# Patient Record
Sex: FEMALE
[De-identification: names, ages, dates, MRNs, and addresses within clinical notes are randomized per-mention and may not be internally consistent; named-entity substitution may affect disease eponyms.]

---

## 2017-06-27 ENCOUNTER — HOSPITAL ENCOUNTER (OUTPATIENT)
Dept: HOSPITAL 31 - C.ER | Age: 57
Setting detail: OBSERVATION
Discharge: HOME | End: 2017-06-27
Attending: EMERGENCY MEDICINE | Admitting: EMERGENCY MEDICINE
Payer: MEDICAID

## 2017-06-27 VITALS — TEMPERATURE: 98.3 F | HEART RATE: 92 BPM | SYSTOLIC BLOOD PRESSURE: 125 MMHG | DIASTOLIC BLOOD PRESSURE: 60 MMHG

## 2017-06-27 VITALS — BODY MASS INDEX: 32.5 KG/M2

## 2017-06-27 VITALS — RESPIRATION RATE: 16 BRPM

## 2017-06-27 VITALS — OXYGEN SATURATION: 95 %

## 2017-06-27 DIAGNOSIS — Z87.891: ICD-10-CM

## 2017-06-27 DIAGNOSIS — K59.00: ICD-10-CM

## 2017-06-27 DIAGNOSIS — K57.92: Primary | ICD-10-CM

## 2017-06-27 LAB
ALBUMIN/GLOB SERPL: 0.7 {RATIO} (ref 1–2.1)
ALP SERPL-CCNC: 93 U/L (ref 38–126)
ALT SERPL-CCNC: 37 U/L (ref 9–52)
AST SERPL-CCNC: 55 U/L (ref 14–36)
BACTERIA #/AREA URNS HPF: (no result) /[HPF]
BASOPHILS # BLD AUTO: 0.1 K/UL (ref 0–0.2)
BASOPHILS NFR BLD: 0.8 % (ref 0–2)
BILIRUB SERPL-MCNC: 0.8 MG/DL (ref 0.2–1.3)
BILIRUB UR-MCNC: NEGATIVE MG/DL
BUN SERPL-MCNC: 10 MG/DL (ref 7–17)
CALCIUM SERPL-MCNC: 9.3 MG/DL (ref 8.6–10.4)
CHLORIDE SERPL-SCNC: 102 MMOL/L (ref 98–107)
CO2 SERPL-SCNC: 24 MMOL/L (ref 22–30)
EOSINOPHIL # BLD AUTO: 0.1 K/UL (ref 0–0.7)
EOSINOPHIL NFR BLD: 2.2 % (ref 0–4)
ERYTHROCYTE [DISTWIDTH] IN BLOOD BY AUTOMATED COUNT: 13.3 % (ref 11.5–14.5)
GLOBULIN SER-MCNC: 5.5 GM/DL (ref 2.2–3.9)
GLUCOSE SERPL-MCNC: 94 MG/DL (ref 65–105)
GLUCOSE UR STRIP-MCNC: NORMAL MG/DL
HCT VFR BLD CALC: 44.2 % (ref 34–47)
KETONES UR STRIP-MCNC: NEGATIVE MG/DL
LEUKOCYTE ESTERASE UR-ACNC: (no result) LEU/UL
LYMPHOCYTES # BLD AUTO: 1.2 K/UL (ref 1–4.3)
LYMPHOCYTES NFR BLD AUTO: 19.5 % (ref 20–40)
MCH RBC QN AUTO: 30.3 PG (ref 27–31)
MCHC RBC AUTO-ENTMCNC: 34.5 G/DL (ref 33–37)
MCV RBC AUTO: 87.9 FL (ref 81–99)
MONOCYTES # BLD: 0.6 K/UL (ref 0–0.8)
MONOCYTES NFR BLD: 9.3 % (ref 0–10)
NRBC BLD AUTO-RTO: 0.1 % (ref 0–2)
PH UR STRIP: 5 [PH] (ref 5–8)
PLATELET # BLD: 242 K/UL (ref 130–400)
PMV BLD AUTO: 7.9 FL (ref 7.2–11.7)
POTASSIUM SERPL-SCNC: 4.4 MMOL/L (ref 3.6–5.2)
PROT SERPL-MCNC: 9.2 G/DL (ref 6.3–8.3)
PROT UR STRIP-MCNC: NEGATIVE MG/DL
RBC # UR STRIP: NEGATIVE /UL
RBC #/AREA URNS HPF: 1 /HPF (ref 0–3)
SODIUM SERPL-SCNC: 137 MMOL/L (ref 132–148)
SP GR UR STRIP: 1.01 (ref 1–1.03)
UROBILINOGEN UR-MCNC: NORMAL MG/DL (ref 0.2–1)
WBC # BLD AUTO: 6.1 K/UL (ref 4.8–10.8)
WBC #/AREA URNS HPF: 11 /HPF (ref 0–5)

## 2017-06-27 PROCEDURE — 74177 CT ABD & PELVIS W/CONTRAST: CPT

## 2017-06-27 PROCEDURE — 86900 BLOOD TYPING SEROLOGIC ABO: CPT

## 2017-06-27 PROCEDURE — 81001 URINALYSIS AUTO W/SCOPE: CPT

## 2017-06-27 PROCEDURE — 86850 RBC ANTIBODY SCREEN: CPT

## 2017-06-27 PROCEDURE — 96367 TX/PROPH/DG ADDL SEQ IV INF: CPT

## 2017-06-27 PROCEDURE — 96375 TX/PRO/DX INJ NEW DRUG ADDON: CPT

## 2017-06-27 PROCEDURE — 96365 THER/PROPH/DIAG IV INF INIT: CPT

## 2017-06-27 PROCEDURE — 80053 COMPREHEN METABOLIC PANEL: CPT

## 2017-06-27 PROCEDURE — 87086 URINE CULTURE/COLONY COUNT: CPT

## 2017-06-27 PROCEDURE — 36415 COLL VENOUS BLD VENIPUNCTURE: CPT

## 2017-06-27 PROCEDURE — 93005 ELECTROCARDIOGRAM TRACING: CPT

## 2017-06-27 PROCEDURE — 71010: CPT

## 2017-06-27 PROCEDURE — 85025 COMPLETE CBC W/AUTO DIFF WBC: CPT

## 2017-06-27 PROCEDURE — 99285 EMERGENCY DEPT VISIT HI MDM: CPT

## 2017-06-27 PROCEDURE — 83690 ASSAY OF LIPASE: CPT

## 2017-06-27 NOTE — RAD
PROCEDURE:  CHEST RADIOGRAPH, 1 VIEW



HISTORY:

Abdominal pain 



COMPARISON:

06/01/2015



FINDINGS:



LUNGS:

Biapical pleural thickening. Moderate venous congestion.  Diffuse 

increased interstitial lung markings. Right hilar prominence. 



PLEURA:

No pneumothorax or pleural fluid seen.



CARDIOVASCULAR:

Normal.



OSSEOUS STRUCTURES:

No significant abnormalities.



VISUALIZED UPPER ABDOMEN:

Normal.



OTHER FINDINGS:

None. 



IMPRESSION:

Biapical pleural thickening. Moderate venous congestion.  Diffuse 

increased interstitial lung markings. Right hilar prominence.

## 2017-06-27 NOTE — CT
PROCEDURE:  CT Abdomen and Pelvis with contrast



HISTORY:

abd pain



COMPARISON:

12/29/2015



TECHNIQUE:

Contrast dose: 100 mL Visipaque 320



Radiation dose:



Total exam DLP = 1203.84 mGy-cm.



This CT exam was performed using one or more of the following dose 

reduction techniques: Automated exposure control, adjustment of the 

mA and/or kV according to patient size, and/or use of iterative 

reconstruction technique.



FINDINGS:



LOWER THORAX:

Multifocal confluent ground-glass opacity in both lower lobes, 

nonspecific.  Possibly infectious versus inflammatory. Small areas of 

natalya consolidation in both lower lobes. 



Small hiatal hernia noted. 



LIVER:

Unremarkable. No gross lesion or ductal dilatation. 



GALLBLADDER AND BILE DUCTS:

Status post cholecystectomy.  Mild dilatation of common bile duct up 

to 9 mm.  Consistent with prior cholecystectomy. Small amount of air 

seen adjacent to distal common bile duct, possibly due to duodenal 

diverticulum. This finding is unchanged compared to prior examination 



PANCREAS:

Unremarkable. No gross lesion or ductal dilatation.



SPLEEN:

Normal size. 1.5 cm ovoid low-attenuation lesion.  In retrospect, on 

noncontrast CT, there was 12 mm low-attenuation lesion. Comparison is 

made between contrast enhanced and noncontrast study of, of limited 

significance for determination of interval change.  Likely hemangioma 

or lymphangioma.  No other mass. 



ADRENALS:

Unremarkable. No mass. 



KIDNEYS AND URETERS:

Unremarkable. No hydronephrosis. No solid mass. Homogeneous 

parenchymal enhancement.  No evidence of pyelonephritis. 



VASCULATURE:

Unremarkable. No aortic aneurysm. 



BOWEL:

Acute diverticulitis at the junction of the descending and sigmoid 

colon.  No evidence of abscess. No free intraperitoneal air.  There 

is extensive diverticulosis of the descending and sigmoid colon.  

Scattered colonic diverticulae elsewhere.  There are no other 

abnormal bowel loops identified.  There is no evidence of bowel 

obstruction. 



APPENDIX:

Normal appendix. 



PERITONEUM:

There is a supraumbilical ventral hernia containing a loop of 

nonobstructed transverse colon. There is no inguinal hernia. 



LYMPH NODES:

Unremarkable. No enlarged lymph nodes. 



BLADDER:

Unremarkable. 



REPRODUCTIVE:

Normal uterus 



BONES:

Mild thoracolumbar levoscoliosis. 



OTHER FINDINGS:

None.



IMPRESSION:

Multifocal ground-glass opacity with small areas of confluent opacity 

in both lower lobes and in the right middle lobe.  Concerning for an 

infectious process. Acute diverticulitis at the junction of the 

descending and sigmoid colon without evidence of abscess or free air. 

Incidental findings as above.

## 2017-06-27 NOTE — C.PDOC
History Of Present Illness


57 yr old female presents to the ER with complaints of worsening abdominal pain 

for the past 1 week. Patient states she saw her PMD for same complaint and 

states she is s/p outpatient abdominal ultrasound but is pending follow up 

results. Patient states the pain is localized in lower abdominal and has had 

similar pain daily for the past 1 year but is now constant and intense. Patient 

reports new onset of intermittent diarrhea/constipation for the past 2 weeks 

and new onset of black bowel movement for the past 3 weeks. States he has been 

using peptobismol but black bowel movement began before pepto use. Patient is s/

p colonoscopy and ENDO, states had a polyp removal only. Patient denies fever, 

dysuria, incontinence, hematuira, back pain, weakness or numbness. 








WORSENING ABD PAIN X 1 WEEK. SAW PMD FOR SAME S/P OUTPT ABD US BUT WAS PENDING 

FU FOR RESULTS. PAIN LOWER ABD LOCALIZED. HAS HAD SIM PAIN DAILY X 1 YEAR BUT 

NOW MORE CONSTANT AND INTENSE. NEW ONSET INTERMIT DIARRHEA/CONSTIPATION X 2 

WEEKS. NEW ONSET BLACK BM X 3 WEEKS. USING PEPTOBISMOL BUT BLACK BM ONSET BEGAN 

BEFORE PEPTO USE. S/P COLONSCOPY AND ENDO, PS POLYP REMOVAL ONLY. PSH CATHY. 

DENIES UTI SX. 





HO MID ABD HERNIA FOR 6 MONTHS. DENIES PAIN IN AREA, LAST BM 2 DAYS AGO





EXAM


MILD DIST NONTOXIC


LUNGS CTA B/L NO W/R/R


ABD +REDUCIBLE VENTRAL HERNIA NONTEND. +B/L LQ TEND MILD SOFT NO R/G. OBESE


REMAINDER NEG


Time Seen by Provider: 17 08:25


Chief Complaint (Nursing): Abdominal Pain


History Per: Patient


History/Exam Limitations: no limitations


Onset/Duration Of Symptoms: Worse Since (1 week )


Current Symptoms Are (Timing): Still Present


Location Of Pain/Discomfort: Diffuse





Past Medical History


Reviewed: Historical Data, Nursing Documentation, Vital Signs


Vital Signs: 


 Last Vital Signs











Temp  98.4 F   17 07:46


 


Pulse  84   17 09:30


 


Resp  16   17 09:30


 


BP  116/67   17 09:30


 


Pulse Ox  95   17 13:17














- Medical History


PMH: Arthritis, Diverticulitis (diverticulosis)


Surgical History: Cholecystectomy, Endoscopy





- [facility] Procedures








CHOLECYSTECTOMY (13)


CLOSED ENDOSCOPIC BIOPSY OF LARGE INTESTINE (02/05/15)


ENDO RECTUM POLYPECTOMY (02/05/15)


ENDOSC POLYPECTOMY OF LG INTEST (02/05/15)


ESOPHAGOGASTRODUODENOSCOPY [EGD] W/CLOSED BIOPSY (13)








Family History: States: No Known Family Hx





- Social History


Hx Tobacco Use: Yes


Hx Alcohol Use: Yes


Hx Substance Use: No





- Immunization History


Hx Tetanus Toxoid Vaccination: No


Hx Influenza Vaccination: No


Hx Pneumococcal Vaccination: No





Review Of Systems


Except As Marked, All Systems Reviewed And Found Negative.


Constitutional: Negative for: Fever


Gastrointestinal: Positive for: Abdominal Pain, Diarrhea, Constipation


Neurological: Negative for: Weakness, Numbness





Physical Exam





- Physical Exam


Appears: Non-toxic, In Acute Distress (Mild )


Skin: Warm, Dry


Head: Atraumatic, Normacephalic


Chest: Symmetrical


Cardiovascular: Rhythm Regular, No Murmur


Respiratory: Normal Breath Sounds, No Rales, No Rhonchi, No Wheezing


Gastrointestinal/Abdominal: Soft, Tenderness (Bilateral lower quadrent 

tenderness, mild. ), No Guarding, No Rebound, Hernia (reducible ventral hernia, 

non tender. ), Other ((+) Obese )


Extremity: Normal ROM, No Swelling


Neurological/Psych: Oriented x3, Normal Speech, Normal Motor





ED Course And Treatment





- Laboratory Results


Result Diagrams: 


 17 09:26





 17 09:26


ECG: Interpreted By Me, Viewed By Me


ECG Rhythm: Sinus Rhythm


ECG Interpretation: No Acute Changes


Rate From EC (BPM )


O2 Sat by Pulse Oximetry: 95 (RA )


Pulse Ox Interpretation: Normal





- Other Rad


  ** CXR


X-Ray: Viewed By Me, Read By Radiologist


Interpretation: PROCEDURE:  CHEST RADIOGRAPH, 1 VIEW.  HISTORY:  Abdominal 

pain.  COMPARISON:  2015.  FINDINGS:  LUNGS:  Biapical pleural 

thickening. Moderate venous congestion.  Diffuse increased interstitial lung 

markings. Right hilar prominence.  PLEURA:  No pneumothorax or pleural fluid 

seen.  CARDIOVASCULAR:  Normal.  OSSEOUS STRUCTURES:  No significant 

abnormalities.  VISUALIZED UPPER ABDOMEN:  Normal.  OTHER FINDINGS:  None.  

IMPRESSION:  Biapical pleural thickening. Moderate venous congestion.  Diffuse 

increased interstitial lung markings. Right hilar prominence.





- CT Scan/US


  ** CT - Abd & Pelvis 


Other Rad Studies (CT/US): Read By Radiologist, Radiology Report Reviewed


CT/US Interpretation: PROCEDURE:  CT Abdomen and Pelvis with contrast.  HISTORY

:  abd pain.  COMPARISON:  2015.  TECHNIQUE:  Contrast dose: 100 mL 

Visipaque 320.  Radiation dose:  Total exam DLP = 1203.84 mGy-cm.  This CT exam 

was performed using one or more of the following dose reduction techniques: 

Automated exposure control, adjustment of the mA and/or kV according to patient 

size, and/or use of iterative reconstruction technique.  FINDINGS:  LOWER THORAX

:  Multifocal confluent ground-glass opacity in both lower lobes, nonspecific.  

Possibly infectious versus inflammatory. Small areas of natalya consolidation in 

both lower lobes.  Small hiatal hernia noted.  LIVER:  Unremarkable. No gross 

lesion or ductal dilatation.  GALLBLADDER AND BILE DUCTS:  Status post 

cholecystectomy.  Mild dilatation of common bile duct up to 9 mm.  Consistent 

with prior cholecystectomy. Small amount of air seen adjacent to distal common 

bile duct, possibly due to duodenal diverticulum. This finding is unchanged 

compared to prior examination.  PANCREAS:  Unremarkable. No gross lesion or 

ductal dilatation.  SPLEEN:  Normal size. 1.5 cm ovoid low-attenuation lesion.  

In retrospect, on noncontrast CT, there was 12 mm low-attenuation lesion. 

Comparison is made between contrast enhanced and noncontrast study of, of 

limited significance for determination of interval change.  Likely hemangioma 

or lymphangioma.  No other mass.  ADRENALS:  Unremarkable. No mass.  KIDNEYS 

AND URETERS:  Unremarkable. No hydronephrosis. No solid mass. Homogeneous 

parenchymal enhancement.  No evidence of pyelonephritis.  VASCULATURE:  

Unremarkable. No aortic aneurysm.  BOWEL:  Acute diverticulitis at the junction 

of the descending and sigmoid colon.  No evidence of abscess. No free 

intraperitoneal air.  There is extensive diverticulosis of the descending and 

sigmoid colon.  Scattered colonic diverticulae elsewhere.  There are no other 

abnormal bowel loops identified.  There is no evidence of bowel obstruction.  

APPENDIX:  Normal appendix.  PERITONEUM:  There is a supraumbilical ventral 

hernia containing a loop of nonobstructed transverse colon. There is no 

inguinal hernia.  LYMPH NODES:  Unremarkable. No enlarged lymph nodes.  BLADDER

:  Unremarkable.  REPRODUCTIVE:  Normal uterus.  BONES:  Mild thoracolumbar 

levoscoliosis.  OTHER FINDINGS:  None.  IMPRESSION:  Multifocal ground-glass 

opacity with small areas of confluent opacity in both lower lobes and in the 

right middle lobe.  Concerning for an infectious process. Acute diverticulitis 

at the junction of the descending and sigmoid colon without evidence of abscess 

or free air. Incidental findings as above.





Medical Decision Making


Medical Decision Making: 


PLAN:


* CT - Abd & Pelvis 


* CXR


* EKG 


* CBC 


* CMP


* Urinalysis 


* Morphine IVP


* Zofran IVP


* Sodium Chloride IV 








ED OBSERVATION


Discharge: Yes


Date of observation admission: 17


Time of observation admission: 08:00





- Observation admission statement


Patient is being placed in observation because:: 





ABD PAIN, NAUSEA





- Goals of Observation


Goals of observation are:: 





NEG S/S ACUTE SURG ABD; SX IMPROVE





- Progress Note


Progress Note: 





17 13:21


FEELS BETTER. ABD NEG VSS


17 13:21


AWARE OF CT FINDINGS ADVISED FU LUNG CT FINDINGS W PMD





Disposition


Counseled Patient/Family Regarding: Studies Performed, Diagnosis, Need For 

Followup, Rx Given





- Disposition


Disposition: HOME/ ROUTINE


Disposition Time: 13:21


Condition: IMPROVED





- Clinical Impression


Clinical Impression: 


 Diverticulitis, Abnormal chest CT








- Scribe Statement


The provider has reviewed the documentation as recorded by the Shiva Sal


Provider Attestation: 


All medical record entries made by the Shiva were at my direction and 

personally dictated by me. I have reviewed the chart and agree that the record 

accurately reflects my personal performance of the history, physical exam, 

medical decision making, and the department course for this patient. I have 

also personally directed, reviewed, and agree with the discharge instructions 

and disposition.

## 2017-06-28 NOTE — CARD
--------------- APPROVED REPORT --------------





EKG Measurement

Heart [id number]

HI [id number]

[id number]

[id number]

[id number]



<Conclusion>

Normal sinus rhythm

Normal ECG

## 2017-09-18 ENCOUNTER — HOSPITAL ENCOUNTER (OUTPATIENT)
Dept: HOSPITAL 31 - C.ENDO | Age: 57
Discharge: HOME | End: 2017-09-18
Attending: INTERNAL MEDICINE
Payer: MEDICAID

## 2017-09-18 VITALS
SYSTOLIC BLOOD PRESSURE: 110 MMHG | HEART RATE: 75 BPM | DIASTOLIC BLOOD PRESSURE: 74 MMHG | OXYGEN SATURATION: 99 % | RESPIRATION RATE: 16 BRPM

## 2017-09-18 VITALS — TEMPERATURE: 97.1 F

## 2017-09-18 VITALS — BODY MASS INDEX: 32.5 KG/M2

## 2017-09-18 DIAGNOSIS — K57.90: Primary | ICD-10-CM

## 2017-09-18 DIAGNOSIS — K59.00: ICD-10-CM

## 2017-09-18 DIAGNOSIS — K64.8: ICD-10-CM

## 2017-09-18 PROCEDURE — 45378 DIAGNOSTIC COLONOSCOPY: CPT

## 2017-09-18 NOTE — CP.SDSHP
Same Day Surgery H & P





- History


Proposed Procedure: Colonoscopy


Pre-Op Diagnosis: H/o diverticulitis





- Previous Medical/Surgical History


Endocrine/Metabolic: Obesity





- Allergies


Allergies: 


Allergies





No Known Allergies Allergy (Verified 06/27/17 07:42)


 











- Physical Exam


General Appearance: nl


Vital Signs: 


 Vital Signs











  09/18/17





  07:32


 


Temperature 97.9 F


 


Pulse Rate 80


 


Respiratory 20





Rate 


 


Blood Pressure 117/68


 


O2 Sat by Pulse 97





Oximetry 











Mental Status: Alert & Oriented x3


Neuro: WNL


Heart: WNL


Lungs: WNL


GI: WNL





- {Optional Preform as Required}


Abdomen: WNL


Rectal: WNL





- Impression


Impression: h/o diverticulitis


Pt. Evaluated Today:Candidate for Anesthesia & Procedure: Yes





- Date & Time


Date: 09/18/17


Time: 08:31





Short Stay Discharge





- Short Stay Discharge


Admitting Diagnosis/Reason for Visit: CONSTIPATION


Disposition: HOME/ ROUTINE

## 2018-01-24 ENCOUNTER — HOSPITAL ENCOUNTER (INPATIENT)
Dept: HOSPITAL 31 - C.9S | Age: 58
LOS: 1 days | Discharge: HOME | DRG: 160 | End: 2018-01-25
Attending: SURGERY | Admitting: SURGERY
Payer: MEDICAID

## 2018-01-24 VITALS — BODY MASS INDEX: 32.5 KG/M2

## 2018-01-24 DIAGNOSIS — K43.2: Primary | ICD-10-CM

## 2018-01-24 DIAGNOSIS — Z90.49: ICD-10-CM

## 2018-01-24 PROCEDURE — 0WUF0JZ SUPPLEMENT ABDOMINAL WALL WITH SYNTHETIC SUBSTITUTE, OPEN APPROACH: ICD-10-PCS | Performed by: SURGERY

## 2018-01-24 RX ADMIN — OXYCODONE HYDROCHLORIDE AND ACETAMINOPHEN PRN TAB: 5; 325 TABLET ORAL at 19:42

## 2018-01-24 NOTE — PCM.SURG1
Surgeon's Initial Post Op Note





- Surgeon's Notes


Surgeon: Dr. Madrigal


Assistant: Veronica PGY1


Type of Anesthesia: General Endo


Pre-Operative Diagnosis: Incisional Hernia


Operative Findings: see operative report


Post-Operative Diagnosis: Incisional hernia


Operation Performed: Incisional hernia repair with Mesh


Specimen/Specimens Removed: Hernia sac


Estimated Blood Loss: EBL {In ML}: 20


Blood Products Given: N/A


Drains Used: No Drains


Post-Op Condition: Good


Date of Surgery/Procedure: 01/24/18


Time of Surgery/Procedure: 13:30

## 2018-01-25 VITALS
RESPIRATION RATE: 18 BRPM | TEMPERATURE: 97.8 F | OXYGEN SATURATION: 93 % | DIASTOLIC BLOOD PRESSURE: 71 MMHG | HEART RATE: 81 BPM | SYSTOLIC BLOOD PRESSURE: 99 MMHG

## 2018-01-25 RX ADMIN — OXYCODONE HYDROCHLORIDE AND ACETAMINOPHEN PRN TAB: 5; 325 TABLET ORAL at 07:18

## 2018-01-25 RX ADMIN — OXYCODONE HYDROCHLORIDE AND ACETAMINOPHEN PRN TAB: 5; 325 TABLET ORAL at 00:36

## 2018-01-25 RX ADMIN — OXYCODONE HYDROCHLORIDE AND ACETAMINOPHEN PRN TAB: 5; 325 TABLET ORAL at 12:23

## 2018-01-25 NOTE — CP.PCM.DIS
Provider





- Provider


Date of Admission: 


01/24/18 12:06





Attending physician: 


Sonido Madrigal MD








Discharge Plan





- Follow Up Plan


Condition: GOOD


Disposition: HOME/ ROUTINE

## 2018-01-26 NOTE — OP
PROCEDURE DATE:  01/24/2018



SURGEON:  Sonido Madrigal MD



ASSISTANT:  Dr. Martin.



TYPE OF ANESTHESIA:  General.



PREOPERATIVE DIAGNOSIS:  Incisional hernia.



POSTOPERATIVE DIAGNOSIS:  Incisional hernia.



PROCEDURE:  Incisional hernia repair with Prolene Hernia System mesh.



DESCRIPTION OF OPERATION:  With the patient in the supine position, the

abdomen was prepped and draped in the usual sterile manner.  The patient

had a Kocher incision in the right upper quadrant from a previous open

cholecystectomy, and hernia was noted involving the medial end of the

Kocher incision.  The skin was opened along the previous incision over the

area of the hernia and taken down through the superficial subcutaneous

tissue.  The hernia sac was sharply freed from the surrounding subcutaneous

tissue and partially cleared down to the level of the fascia.  The hernia

sac was then opened to better delineate the exact dimensions of the hernia.

There was moderate amount of properitoneal fat, possibly related to the

falciform ligament adjacent to the hernia, and a small amount of omentum

actually adherent to the inner surface of the hernia sac.  The omentum was

freed up from the sac and returned to the peritoneal cavity.  Palpation

around the hernia revealed no other viscera adherent to the abdominal wall

in the area of the hernia.  The hernia sac was then freed up at the edges

of the defect, which was just under 3 inches in diameter to allow a

preperitoneal placement of the mesh, and excess hernia sac was excised at

the level of the fascia.  A large PHS mesh was then positioned within the

defect and noted to cover the defect nicely on the inside with 2 cm of

coverage both medially and laterally.  Figure-of-eight sutures of 0 Prolene

were placed at the medial and lateral ends of the defect to narrow the

defect slightly, and these were tacked to allow later positioning of the

outer layer of the mesh.  When this had been completed, the stalk of the

PHS mesh system was noted to fill the remaining defect.  The upper edge of

the stalk was then sutured circumferentially to the edges of the defect

using interrupted sutures of 0 Prolene.  The outer layer of mesh, which was

oriented along the access of the old scar was fixed down to the fascia

using the previously placed sutures as well.  The operative site was

examined for hemostasis.  Subcutaneous tissue was approximated with 3-0

Vicryl sutures, and the skin was closed with staples.  Dry sterile dressing

was applied.  The patient tolerated the procedure well and transferred to

recovery room in stable condition.  Estimated blood loss for the procedure

was 20 mL.



__________________________________________

Sonido Madrigal MD



DD:  01/25/2018 17:13:48

DT:  01/25/2018 20:35:12

Job # [id number]

## 2019-01-24 ENCOUNTER — HOSPITAL ENCOUNTER (OUTPATIENT)
Dept: HOSPITAL 31 - C.ENDO | Age: 59
Discharge: HOME | End: 2019-01-24
Attending: INTERNAL MEDICINE
Payer: MEDICAID

## 2019-01-24 VITALS — RESPIRATION RATE: 20 BRPM | HEART RATE: 88 BPM

## 2019-01-24 VITALS — DIASTOLIC BLOOD PRESSURE: 70 MMHG | SYSTOLIC BLOOD PRESSURE: 104 MMHG | OXYGEN SATURATION: 98 %

## 2019-01-24 VITALS — TEMPERATURE: 97.3 F

## 2019-01-24 VITALS — BODY MASS INDEX: 31.6 KG/M2

## 2019-01-24 DIAGNOSIS — Z79.899: ICD-10-CM

## 2019-01-24 DIAGNOSIS — Z87.19: ICD-10-CM

## 2019-01-24 DIAGNOSIS — K29.50: ICD-10-CM

## 2019-01-24 DIAGNOSIS — Z98.890: ICD-10-CM

## 2019-01-24 DIAGNOSIS — K57.30: ICD-10-CM

## 2019-01-24 DIAGNOSIS — K64.1: ICD-10-CM

## 2019-01-24 DIAGNOSIS — Z79.2: ICD-10-CM

## 2019-01-24 DIAGNOSIS — K59.00: ICD-10-CM

## 2019-01-24 DIAGNOSIS — K44.9: ICD-10-CM

## 2019-01-24 DIAGNOSIS — K21.0: ICD-10-CM

## 2019-01-24 DIAGNOSIS — Z12.11: Primary | ICD-10-CM

## 2019-01-24 DIAGNOSIS — Z90.49: ICD-10-CM

## 2019-01-24 PROCEDURE — 45378 DIAGNOSTIC COLONOSCOPY: CPT

## 2019-01-24 PROCEDURE — 88312 SPECIAL STAINS GROUP 1: CPT

## 2019-01-24 PROCEDURE — 43239 EGD BIOPSY SINGLE/MULTIPLE: CPT

## 2019-01-24 PROCEDURE — 88305 TISSUE EXAM BY PATHOLOGIST: CPT

## 2019-01-24 PROCEDURE — 88342 IMHCHEM/IMCYTCHM 1ST ANTB: CPT
